# Patient Record
Sex: MALE | Race: WHITE | NOT HISPANIC OR LATINO | Employment: OTHER | URBAN - METROPOLITAN AREA
[De-identification: names, ages, dates, MRNs, and addresses within clinical notes are randomized per-mention and may not be internally consistent; named-entity substitution may affect disease eponyms.]

---

## 2018-07-24 ENCOUNTER — OFFICE VISIT (OUTPATIENT)
Dept: CARDIOLOGY CLINIC | Facility: CLINIC | Age: 53
End: 2018-07-24
Payer: MEDICARE

## 2018-07-24 VITALS
SYSTOLIC BLOOD PRESSURE: 130 MMHG | DIASTOLIC BLOOD PRESSURE: 90 MMHG | BODY MASS INDEX: 29.82 KG/M2 | WEIGHT: 190 LBS | HEIGHT: 67 IN

## 2018-07-24 DIAGNOSIS — I25.10 CORONARY ARTERY DISEASE INVOLVING NATIVE CORONARY ARTERY OF NATIVE HEART WITHOUT ANGINA PECTORIS: ICD-10-CM

## 2018-07-24 DIAGNOSIS — I25.5 ISCHEMIC CARDIOMYOPATHY: Primary | ICD-10-CM

## 2018-07-24 DIAGNOSIS — G35 MULTIPLE SCLEROSIS (HCC): ICD-10-CM

## 2018-07-24 PROBLEM — I10 HTN (HYPERTENSION), BENIGN: Status: ACTIVE | Noted: 2018-07-24

## 2018-07-24 PROBLEM — E78.2 MIXED HYPERLIPIDEMIA: Status: ACTIVE | Noted: 2018-07-24

## 2018-07-24 PROCEDURE — 93000 ELECTROCARDIOGRAM COMPLETE: CPT | Performed by: INTERNAL MEDICINE

## 2018-07-24 PROCEDURE — 99214 OFFICE O/P EST MOD 30 MIN: CPT | Performed by: INTERNAL MEDICINE

## 2018-07-24 RX ORDER — ASPIRIN 81 MG/1
81 TABLET ORAL DAILY
COMMUNITY

## 2018-07-24 RX ORDER — SIMVASTATIN 40 MG
40 TABLET ORAL DAILY
COMMUNITY
Start: 2018-04-30 | End: 2018-07-24 | Stop reason: DRUGHIGH

## 2018-07-24 RX ORDER — LISINOPRIL 2.5 MG/1
2.5 TABLET ORAL DAILY
COMMUNITY
Start: 2018-05-17 | End: 2019-09-19 | Stop reason: SDUPTHER

## 2018-07-24 RX ORDER — OXYCODONE HYDROCHLORIDE 10 MG/1
15 TABLET ORAL 2 TIMES DAILY
COMMUNITY
Start: 2018-07-21

## 2018-07-24 RX ORDER — ATORVASTATIN CALCIUM 40 MG/1
40 TABLET, FILM COATED ORAL DAILY
Qty: 90 TABLET | Refills: 5 | Status: SHIPPED | OUTPATIENT
Start: 2018-07-24 | End: 2019-10-23 | Stop reason: SDUPTHER

## 2018-07-24 RX ORDER — MELATONIN
1000 DAILY
COMMUNITY

## 2018-07-24 RX ORDER — METOPROLOL TARTRATE 50 MG/1
50 TABLET, FILM COATED ORAL 2 TIMES DAILY
COMMUNITY
Start: 2018-07-18

## 2018-07-24 NOTE — PROGRESS NOTES
Patient ID: Sally Fisher is a 45 y o  male  Plan:      Coronary artery disease involving native coronary artery of native heart without angina pectoris  4 years post IWMI with stenting  NO symptoms  Mixed hyperlipidemia  Will switch to higher intensity statin therapy  Ischemic cardiomyopathy  No signs or symptoms of congestive heart failure  Ejection fraction 40% by echo last year with evidence for prior inferior wall MI     HTN (hypertension), benign  Adequately controlled  Salt discretion was discussed  Follow up Plan:  One year EKG and follow-up visit  HPI:   Matt Ordonez comes in for follow-up today regarding prior inferior wall MI with stenting of the right coronary artery in 2014, hypertension, and hyperlipidemia  He has had no recent cardiac symptoms  His main problem in functioning in the world is his multiple sclerosis and difficulty with walking  Results for orders placed or performed in visit on 07/24/18   POCT ECG    Narrative    NSR  Prior IWMI  NSSTs  Other cardiac testing:  Echocardiogram on 06/30/2017 revealed ejection fraction of 40% with evidence for prior inferior wall MI  Mild LVH  Past Surgical History:   Procedure Laterality Date    CARDIAC CATHETERIZATION      ERVIN RCA x2 3/2014     CMP: No results found for: NA, K, CL, CO2, BUN, CREATININE, GLUCOSE, EGFR    Lipid Profile: No results found for: CHOL, TRIG, HDL, LDL      Review of Systems    Complete 12  point ROS reviewed and otherwise non pertinent or negative except as per HPI or as below  Gait:  Difficult  He uses a cane because of knee problems and multiple sclerosis  Objective:     /90   Ht 5' 7" (1 702 m)   Wt 86 2 kg (190 lb)   BMI 29 76 kg/m²     PHYSICAL EXAM:    General:  Normal appearance in no distress  Eyes:  Anicteric  Oral mucosa:  Moist   Neck:  No JVD  Carotid upstrokes are brisk without bruits  No masses  Chest:  Clear to auscultation and percussion    Cardiac: Normal PMI  Normal S1 and S2  No murmur gallop or rub  Abdomen:  Soft and nontender  No palpable organomegaly or aortic enlargement  Extremities:  No peripheral edema  Musculoskeletal:  Gait is poor because of multiple sclerosis  He uses a cane  Vascular:  Femoral pulses are brisk without bruits  Popliteal pulses are intact bilaterally  Pedal pulses are intact  Neuro:  Grossly symmetric  Psych:  Alert and oriented x3  Current Outpatient Prescriptions:     aspirin (ECOTRIN LOW STRENGTH) 81 mg EC tablet, Take 81 mg by mouth daily, Disp: , Rfl:     cholecalciferol (VITAMIN D3) 1,000 units tablet, Take 1,000 Units by mouth daily, Disp: , Rfl:     lisinopril (ZESTRIL) 2 5 mg tablet, Take 2 5 mg by mouth daily, Disp: , Rfl:     metoprolol tartrate (LOPRESSOR) 50 mg tablet, Take 50 mg by mouth 2 (two) times a day, Disp: , Rfl:     oxyCODONE (ROXICODONE) 10 MG TABS, Take 10 mg by mouth 2 (two) times a day, Disp: , Rfl:     sertraline (ZOLOFT) 50 mg tablet, Take 50 mg by mouth daily, Disp: , Rfl:     atorvastatin (LIPITOR) 40 mg tablet, Take 1 tablet (40 mg total) by mouth daily, Disp: 90 tablet, Rfl: 5  Allergies   Allergen Reactions    Sulfa Antibiotics Rash     Past Medical History:   Diagnosis Date    CAD (coronary artery disease)     s/p ERVIN RCA x2 3/2014    History of cardiovascular stress test     3/26/2015 [place] 38% prior inferiorwall MI  No ischemia   History of EKG     6/13/2017 Normal sinus rhythm  Prior inferior wall MI  No change from prior   Hx of echocardiogram     6/30/17 EF 40% Mild LVH   Trace aortic and mitral regurgitation    Hyperlipidemia     Hypertension     Ischemic cardiomyopathy     [facility])     [facility] infarction ([facility] [address] [facility]     PAF (paroxysmal atrial fibrillation) ([facility])     Paroxysmal SVT (supraventricular tachycardia) ([facility])     Pericardial effusion            History   Smoking Status    Never Smoker   Smokeless Tobacco    Never Used

## 2018-07-24 NOTE — ASSESSMENT & PLAN NOTE
No signs or symptoms of congestive heart failure    Ejection fraction 40% by echo last year with evidence for prior inferior wall MI

## 2018-07-24 NOTE — PATIENT INSTRUCTIONS
When run out of simvastatin, change to atorvastatin 40 mg once daily (at night)  If no chest pain or problems, come back in 1 year

## 2019-09-19 ENCOUNTER — OFFICE VISIT (OUTPATIENT)
Dept: CARDIOLOGY CLINIC | Facility: CLINIC | Age: 54
End: 2019-09-19
Payer: MEDICARE

## 2019-09-19 VITALS
HEIGHT: 66 IN | SYSTOLIC BLOOD PRESSURE: 150 MMHG | HEART RATE: 63 BPM | BODY MASS INDEX: 29.89 KG/M2 | WEIGHT: 186 LBS | DIASTOLIC BLOOD PRESSURE: 102 MMHG | OXYGEN SATURATION: 99 %

## 2019-09-19 DIAGNOSIS — E78.2 MIXED HYPERLIPIDEMIA: ICD-10-CM

## 2019-09-19 DIAGNOSIS — I25.10 CORONARY ARTERY DISEASE INVOLVING NATIVE CORONARY ARTERY OF NATIVE HEART WITHOUT ANGINA PECTORIS: ICD-10-CM

## 2019-09-19 DIAGNOSIS — I25.5 ISCHEMIC CARDIOMYOPATHY: ICD-10-CM

## 2019-09-19 DIAGNOSIS — I10 HTN (HYPERTENSION), BENIGN: Primary | ICD-10-CM

## 2019-09-19 PROCEDURE — 99214 OFFICE O/P EST MOD 30 MIN: CPT | Performed by: INTERNAL MEDICINE

## 2019-09-19 PROCEDURE — 93000 ELECTROCARDIOGRAM COMPLETE: CPT | Performed by: INTERNAL MEDICINE

## 2019-09-19 RX ORDER — LISINOPRIL 10 MG/1
10 TABLET ORAL DAILY
Qty: 90 TABLET | Refills: 5 | Status: SHIPPED | OUTPATIENT
Start: 2019-09-19 | End: 2020-12-13

## 2019-09-19 NOTE — PROGRESS NOTES
Patient ID: Shane Son is a 52 y o  male  Plan:      Mixed hyperlipidemia  Tolerating statin tx  Coronary artery disease involving native coronary artery of native heart without angina pectoris  Inferior wall MI 2014 with stenting but residual dysfunction  HTN (hypertension), benign  Inadequately controlled  Will increase lisinopril to 10 mg daily  Follow up Plan:  Apart from the lisinopril dose change, no changes today  Return visit and EKG in 1 year  HPI:  The patient is seen today in follow-up regarding CAD, hyperlipidemia, mild ischemic cardiomyopathy, and hypertension  In 2014 he had an acute inferior wall MI  He received a stent of the right coronary artery but certainly there was residual inferior wall dysfunction  His main functional issue in the world is multiple sclerosis  He sees Neurology at [facility] in this regard  No recent chest pain or chest pressure  No shortness of breath  The biggest issue remains walking  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  Results for orders placed or performed in visit on 09/19/19   POCT ECG    Impression    NSR  Prior IWMI  NSSTs  Most recent or relevant cardiac/vascular testing:    Echocardiogram 6/30/2017:  Ejection fraction 40%  Prior inferior wall MI  Mild LVH  Past Surgical History:   Procedure Laterality Date    CARDIAC CATHETERIZATION      ERVIN RCA x2 3/2014     CMP: No results found for: NA, K, CL, CO2, BUN, CREATININE, GLUCOSE, EGFR    Lipid Profile: No results found for: CHOL, TRIG, HDL, LDL      Review of Systems   10  point ROS  was otherwise non pertinent or negative except as per HPI or as below  Gait:  Uses a cane  Chronic right shoulder issues  Chronic weakness right arm and right leg          Objective:     BP (!) 150/102 (BP Location: Left arm, Patient Position: Sitting, Cuff Size: Standard)   Pulse 63   Ht 5' 6" (1 676 m)   Wt 84 4 kg (186 lb)   SpO2 99% BMI 30 02 kg/m²     PHYSICAL EXAM:    General:  Normal appearance in no distress  Eyes:  Anicteric  Oral mucosa:  Moist   Neck:  No JVD  Carotid upstrokes are brisk without bruits  No masses  Chest:  Clear to auscultation and percussion  Cardiac:  Normal PMI  Normal S1 and S2  No murmur gallop or rub  Abdomen:  Soft and nontender  No palpable organomegaly or aortic enlargement  Extremities:  No peripheral edema  Musculoskeletal:  Symmetric  Vascular:  Femoral pulses are brisk without bruits  Popliteal pulses are intact bilaterally  Pedal pulses are intact  Neuro:  Weakness of the right arm and leg  Psych:  Alert and oriented x3  Current Outpatient Medications:     aspirin (ECOTRIN LOW STRENGTH) 81 mg EC tablet, Take 81 mg by mouth daily, Disp: , Rfl:     atorvastatin (LIPITOR) 40 mg tablet, Take 1 tablet (40 mg total) by mouth daily, Disp: 90 tablet, Rfl: 5    cholecalciferol (VITAMIN D3) 1,000 units tablet, Take 1,000 Units by mouth daily, Disp: , Rfl:     lisinopril (ZESTRIL) 10 mg tablet, Take 1 tablet (10 mg total) by mouth daily, Disp: 90 tablet, Rfl: 5    metoprolol tartrate (LOPRESSOR) 50 mg tablet, Take 50 mg by mouth 2 (two) times a day, Disp: , Rfl:     oxyCODONE (ROXICODONE) 10 MG TABS, Take 15 mg by mouth 2 (two) times a day , Disp: , Rfl:     sertraline (ZOLOFT) 50 mg tablet, Take 50 mg by mouth daily, Disp: , Rfl:   Allergies   Allergen Reactions    Sulfa Antibiotics Rash     Past Medical History:   Diagnosis Date    CAD (coronary artery disease)     s/p ERVIN RCA x2 3/2014    History of cardiovascular stress test     3/26/2015 [place] 38% prior inferiorwall MI  No ischemia   History of EKG     6/13/2017 Normal sinus rhythm  Prior inferior wall MI  No change from prior   Hx of echocardiogram     6/30/17 EF 40% Mild LVH   Trace aortic and mitral regurgitation    Hyperlipidemia     Hypertension     Ischemic cardiomyopathy     [facility] [address] [facility]     [facility] [address] [facility]     PAF (paroxysmal atrial fibrillation) (H[facility])     Paroxysmal SVT (supraventricular tachycardia) (HCC)     Pericardial effusion            Social History     Tobacco Use   Smoking Status Never Smoker   Smokeless Tobacco Never Used

## 2019-09-19 NOTE — PATIENT INSTRUCTIONS
Changing the ultimate dose of lisinopril to 10 mg daily  I sent in a new prescription  In the meantime take 2 of your current lisinopril pills daily until that is finished

## 2019-10-23 DIAGNOSIS — I25.5 ISCHEMIC CARDIOMYOPATHY: ICD-10-CM

## 2019-10-23 DIAGNOSIS — I25.10 CORONARY ARTERY DISEASE INVOLVING NATIVE CORONARY ARTERY OF NATIVE HEART WITHOUT ANGINA PECTORIS: ICD-10-CM

## 2019-10-23 RX ORDER — ATORVASTATIN CALCIUM 40 MG/1
TABLET, FILM COATED ORAL
Qty: 90 TABLET | Refills: 4 | Status: SHIPPED | OUTPATIENT
Start: 2019-10-23 | End: 2020-10-25

## 2020-10-22 ENCOUNTER — OFFICE VISIT (OUTPATIENT)
Dept: CARDIOLOGY CLINIC | Facility: CLINIC | Age: 55
End: 2020-10-22
Payer: MEDICARE

## 2020-10-22 VITALS
WEIGHT: 198 LBS | HEIGHT: 66 IN | BODY MASS INDEX: 31.82 KG/M2 | HEART RATE: 67 BPM | SYSTOLIC BLOOD PRESSURE: 130 MMHG | DIASTOLIC BLOOD PRESSURE: 90 MMHG

## 2020-10-22 DIAGNOSIS — I25.5 ISCHEMIC CARDIOMYOPATHY: ICD-10-CM

## 2020-10-22 DIAGNOSIS — R06.00 DOE (DYSPNEA ON EXERTION): ICD-10-CM

## 2020-10-22 DIAGNOSIS — E78.2 MIXED HYPERLIPIDEMIA: ICD-10-CM

## 2020-10-22 DIAGNOSIS — I25.10 CORONARY ARTERY DISEASE INVOLVING NATIVE CORONARY ARTERY OF NATIVE HEART WITHOUT ANGINA PECTORIS: Primary | ICD-10-CM

## 2020-10-22 PROCEDURE — 93000 ELECTROCARDIOGRAM COMPLETE: CPT | Performed by: INTERNAL MEDICINE

## 2020-10-22 PROCEDURE — 99214 OFFICE O/P EST MOD 30 MIN: CPT | Performed by: INTERNAL MEDICINE

## 2020-10-22 RX ORDER — CLONAZEPAM 0.5 MG/1
TABLET ORAL AS NEEDED
COMMUNITY

## 2020-10-25 DIAGNOSIS — I25.5 ISCHEMIC CARDIOMYOPATHY: ICD-10-CM

## 2020-10-25 DIAGNOSIS — I25.10 CORONARY ARTERY DISEASE INVOLVING NATIVE CORONARY ARTERY OF NATIVE HEART WITHOUT ANGINA PECTORIS: ICD-10-CM

## 2020-10-25 RX ORDER — ATORVASTATIN CALCIUM 40 MG/1
TABLET, FILM COATED ORAL
Qty: 90 TABLET | Refills: 4 | Status: SHIPPED | OUTPATIENT
Start: 2020-10-25 | End: 2022-01-07

## 2020-10-29 ENCOUNTER — HOSPITAL ENCOUNTER (OUTPATIENT)
Dept: NON INVASIVE DIAGNOSTICS | Facility: HOSPITAL | Age: 55
Discharge: HOME | End: 2020-10-29
Attending: INTERNAL MEDICINE
Payer: MEDICARE

## 2020-10-29 ENCOUNTER — HOSPITAL ENCOUNTER (OUTPATIENT)
Dept: NUCLEAR MEDICINE | Facility: HOSPITAL | Age: 55
Discharge: HOME | End: 2020-10-29
Attending: INTERNAL MEDICINE
Payer: MEDICARE

## 2020-10-29 ENCOUNTER — HOSPITAL ENCOUNTER (OUTPATIENT)
Dept: NUCLEAR MEDICINE | Facility: HOSPITAL | Age: 55
Discharge: HOME | End: 2020-10-29
Attending: INTERNAL MEDICINE

## 2020-10-29 DIAGNOSIS — I25.5 ISCHEMIC CARDIOMYOPATHY: ICD-10-CM

## 2020-10-29 DIAGNOSIS — R06.00 DOE (DYSPNEA ON EXERTION): ICD-10-CM

## 2020-10-29 DIAGNOSIS — I25.10 CORONARY ARTERY DISEASE INVOLVING NATIVE CORONARY ARTERY OF NATIVE HEART WITHOUT ANGINA PECTORIS: ICD-10-CM

## 2020-10-29 LAB
ARRHY DURING EX: NORMAL
CHEST PAIN STATEMENT: NORMAL
MAX DIASTOLIC BP: 80 MMHG
MAX HEART RATE: 104 BPM
MAX PREDICTED HEART RATE: 164 BPM
MAX. SYSTOLIC BP: 132 MMHG
PROTOCOL NAME: NORMAL
REASON FOR TERMINATION: NORMAL
TARGET HR FORMULA: NORMAL
TEST INDICATION: NORMAL
TIME IN EXERCISE PHASE: NORMAL

## 2020-10-29 PROCEDURE — 93018 CV STRESS TEST I&R ONLY: CPT | Performed by: INTERNAL MEDICINE

## 2020-10-29 PROCEDURE — 78452 HT MUSCLE IMAGE SPECT MULT: CPT | Performed by: INTERNAL MEDICINE

## 2020-10-29 PROCEDURE — 78452 HT MUSCLE IMAGE SPECT MULT: CPT

## 2020-10-29 PROCEDURE — 93017 CV STRESS TEST TRACING ONLY: CPT

## 2020-10-29 PROCEDURE — G1004 CDSM NDSC: HCPCS

## 2020-10-29 PROCEDURE — 93016 CV STRESS TEST SUPVJ ONLY: CPT | Performed by: INTERNAL MEDICINE

## 2020-10-29 PROCEDURE — A9502 TC99M TETROFOSMIN: HCPCS

## 2020-10-29 RX ADMIN — REGADENOSON 0.4 MG: 0.08 INJECTION, SOLUTION INTRAVENOUS at 10:55

## 2020-12-13 DIAGNOSIS — I10 HTN (HYPERTENSION), BENIGN: ICD-10-CM

## 2020-12-13 DIAGNOSIS — I25.5 ISCHEMIC CARDIOMYOPATHY: ICD-10-CM

## 2020-12-13 RX ORDER — LISINOPRIL 10 MG/1
TABLET ORAL
Qty: 90 TABLET | Refills: 5 | Status: SHIPPED | OUTPATIENT
Start: 2020-12-13 | End: 2022-03-02

## 2021-10-12 ENCOUNTER — OFFICE VISIT (OUTPATIENT)
Dept: CARDIOLOGY CLINIC | Facility: CLINIC | Age: 56
End: 2021-10-12
Payer: MEDICARE

## 2021-10-12 VITALS
WEIGHT: 190 LBS | HEIGHT: 66 IN | HEART RATE: 62 BPM | DIASTOLIC BLOOD PRESSURE: 80 MMHG | SYSTOLIC BLOOD PRESSURE: 120 MMHG | BODY MASS INDEX: 30.53 KG/M2

## 2021-10-12 DIAGNOSIS — E78.2 MIXED HYPERLIPIDEMIA: ICD-10-CM

## 2021-10-12 DIAGNOSIS — I25.5 ISCHEMIC CARDIOMYOPATHY: ICD-10-CM

## 2021-10-12 DIAGNOSIS — I25.10 CORONARY ARTERY DISEASE INVOLVING NATIVE CORONARY ARTERY OF NATIVE HEART WITHOUT ANGINA PECTORIS: Primary | ICD-10-CM

## 2021-10-12 PROCEDURE — 93000 ELECTROCARDIOGRAM COMPLETE: CPT | Performed by: INTERNAL MEDICINE

## 2021-10-12 PROCEDURE — 99214 OFFICE O/P EST MOD 30 MIN: CPT | Performed by: INTERNAL MEDICINE

## 2022-01-07 DIAGNOSIS — I25.5 ISCHEMIC CARDIOMYOPATHY: ICD-10-CM

## 2022-01-07 DIAGNOSIS — I25.10 CORONARY ARTERY DISEASE INVOLVING NATIVE CORONARY ARTERY OF NATIVE HEART WITHOUT ANGINA PECTORIS: ICD-10-CM

## 2022-01-07 RX ORDER — ATORVASTATIN CALCIUM 40 MG/1
TABLET, FILM COATED ORAL
Qty: 90 TABLET | Refills: 4 | Status: SHIPPED | OUTPATIENT
Start: 2022-01-07

## 2022-03-01 DIAGNOSIS — I10 HTN (HYPERTENSION), BENIGN: ICD-10-CM

## 2022-03-01 DIAGNOSIS — I25.5 ISCHEMIC CARDIOMYOPATHY: ICD-10-CM

## 2022-03-02 RX ORDER — LISINOPRIL 10 MG/1
TABLET ORAL
Qty: 90 TABLET | Refills: 5 | Status: SHIPPED | OUTPATIENT
Start: 2022-03-02

## 2023-05-15 ENCOUNTER — OFFICE VISIT (OUTPATIENT)
Dept: URGENT CARE | Facility: CLINIC | Age: 58
End: 2023-05-15

## 2023-05-15 ENCOUNTER — APPOINTMENT (OUTPATIENT)
Dept: RADIOLOGY | Facility: CLINIC | Age: 58
End: 2023-05-15

## 2023-05-15 VITALS
WEIGHT: 200 LBS | OXYGEN SATURATION: 98 % | DIASTOLIC BLOOD PRESSURE: 86 MMHG | SYSTOLIC BLOOD PRESSURE: 140 MMHG | HEART RATE: 69 BPM | BODY MASS INDEX: 32.14 KG/M2 | HEIGHT: 66 IN | RESPIRATION RATE: 18 BRPM | TEMPERATURE: 98.6 F

## 2023-05-15 DIAGNOSIS — S82.832A OTHER CLOSED FRACTURE OF DISTAL END OF LEFT FIBULA, INITIAL ENCOUNTER: Primary | ICD-10-CM

## 2023-05-15 DIAGNOSIS — S99.922A INJURY OF LEFT FOOT, INITIAL ENCOUNTER: ICD-10-CM

## 2023-05-15 RX ORDER — OXYCODONE HYDROCHLORIDE 20 MG/1
TABLET ORAL
COMMUNITY
Start: 2023-04-28

## 2023-05-15 NOTE — PROGRESS NOTES
[facility]        NAME: Esequiel Enriquez is a 59 y o  male  : 1965    MRN: [id number]  DATE: May 15, 2023  TIME: 1:51 PM    Assessment and Plan   Other closed fracture of distal end of left fibula, initial encounter [X17 [id number]]  1  Other closed fracture of distal end of left fibula, initial encounter  XR foot 3+ vw left    XR ankle 3+ vw left            Patient Instructions       Follow up with PCP in 3-5 days  Proceed to  ER if symptoms worsen  Chief Complaint     Chief Complaint   Patient presents with   • Foot Pain     On Saturday twisted left foot and ankle  He thinks he heard the bone cracking in foot and ankle  Has a HX of MS  History of Present Illness       Patient is a 58 y/o/m presenting [facility] with left foot and ankle pain  Patient twisted ankle 2 days ago and fell  He reports hearing bone crack during that time  Patient denies       Review of Systems   Review of Systems   Constitutional: Negative for chills and fever  HENT: Negative for ear pain and sore throat  Eyes: Negative for pain and visual disturbance  Respiratory: Negative for cough and shortness of breath  Cardiovascular: Negative for chest pain and palpitations  Gastrointestinal: Negative for abdominal pain and vomiting  Genitourinary: Negative for dysuria and hematuria  Musculoskeletal: Positive for arthralgias (Left foot ankle and ankle pain)  Negative for back pain  Skin: Negative for color change and rash  Neurological: Negative for seizures and syncope  All other systems reviewed and are negative          Current Medications       Current Outpatient Medications:   •  aspirin (ECOTRIN LOW STRENGTH) 81 mg EC tablet, Take 81 mg by mouth daily, Disp: , Rfl:   •  atorvastatin (LIPITOR) 40 mg tablet, take 1 tablet by mouth once daily, Disp: 90 tablet, Rfl: 3  •  cholecalciferol (VITAMIN D3) 1,000 units tablet, Take 1,000 Units by mouth daily, Disp: , Rfl:   •  lisinopril (ZESTRIL) 10 mg "tablet, TAKE ONE TABLET BY MOUTH ONCE DAILY, Disp: 90 tablet, Rfl: 5  •  metoprolol tartrate (LOPRESSOR) 50 mg tablet, Take 50 mg by mouth 2 (two) times a day, Disp: , Rfl:   •  clonazePAM (KlonoPIN) 0 5 mg tablet, as needed (Patient not taking: Reported on 5/15/2023), Disp: , Rfl:   •  oxyCODONE (ROXICODONE) 20 MG TABS, take 1 tablet by mouth every 8 hours if needed for MODERATE PAIN ( PAIN SCALE 4-6 ), Disp: , Rfl:   •  sertraline (ZOLOFT) 50 mg tablet, Take 50 mg by mouth as needed  (Patient not taking: Reported on 5/15/2023), Disp: , Rfl:     Current Allergies     Allergies as of 05/15/2023 - Reviewed 05/15/2023   Allergen Reaction Noted   • Sulfa antibiotics Rash 07/20/2018            The following portions of the patient's history were reviewed and updated as appropriate: allergies, current medications, past family history, past medical history, past social history, past surgical history and problem list      Past Medical History:   Diagnosis Date   • CAD (coronary artery disease)     s/p ERVIN RCA x2 3/2014   • History of cardiovascular stress test     3/26/2015 [place] 38% prior inferiorwall MI  No ischemia  • History of EKG     6/13/2017 Normal sinus rhythm  Prior inferior wall MI  No change from prior  • Hx of echocardiogram     6/30/17 EF 40% Mild LVH  Trace aortic and mitral regurgitation   • Hyperlipidemia    • Hypertension    • Ischemic cardiomyopathy    • MS (multiple sclerosis) ([facility])    • Myocardial infarction ([facility])    • PAF (paroxysmal atrial fibrillation) ([facility])    • Paroxysmal SVT (supraventricular tachycardia) ([facility])    • Pericardial effusion        Past Surgical History:   Procedure Laterality Date   • CARDIAC CATHETERIZATION      ERVIN RCA x2 3/2014       Family History   Problem Relation Age of Onset   • Heart disease Father    • Heart attack Father          Medications have been verified          Objective   /86   Pulse 69   Temp 98 6 °F (37 °C)   Resp 18   Ht 5' 6\" (1 676 m)   Wt 90 7 " kg (200 lb)   SpO2 98%   BMI 32 28 kg/m²   No LMP for male patient  Physical Exam     Physical Exam  Constitutional:       Appearance: Normal appearance  He is normal weight  HENT:      Head: Normocephalic and atraumatic  Nose: Nose normal       Mouth/Throat:      Mouth: Mucous membranes are moist    Eyes:      Extraocular Movements: Extraocular movements intact  Conjunctiva/sclera: Conjunctivae normal       Pupils: Pupils are equal, round, and reactive to light  Cardiovascular:      Rate and Rhythm: Normal rate  Pulmonary:      Effort: Pulmonary effort is normal    Musculoskeletal:         General: Normal range of motion  Cervical back: Normal range of motion and neck supple  Legs:    Skin:     General: Skin is warm and dry  Neurological:      General: No focal deficit present  Mental Status: He is alert and oriented to person, place, and time     Psychiatric:         Mood and Affect: Mood normal          Behavior: Behavior normal

## 2023-05-15 NOTE — PATIENT INSTRUCTIONS
Orthopedic appointment scheduled for Tuesday 05/16/2023 at [address] [facility] w/ Dr Thakur  Non weight bearing  If symptoms worsen report to the ED  Ankle Fracture   WHAT YOU NEED TO KNOW:   An ankle fracture is a break in 1 or more of the bones in your ankle  DISCHARGE INSTRUCTIONS:   Call your local emergency number (911 in the [address] [facility] [facility]) for any of the following: You feel lightheaded, short of breath, and have chest pain  You cough up blood  Return to the emergency department if:   Your leg feels warm, tender, and painful  It may look swollen and red  Blood soaks through your bandage  You have severe pain in your ankle  Your cast feels too tight  Your foot or toes are cold or numb  Your foot or toenails turn blue or gray  Call your doctor if:   Your splint feels too tight  Your swelling has increased or returned  You have a fever  Your pain does not go away, even after treatment  You have questions or concerns about your condition or care  Medicines: You may need any of the following:  Acetaminophen  decreases pain and fever  It is available without a doctor's order  Ask how much to take and how often to take it  Follow directions  Read the labels of all other medicines you are using to see if they also contain acetaminophen, or ask your doctor or pharmacist  Acetaminophen can cause liver damage if not taken correctly  NSAIDs , such as ibuprofen, help decrease swelling, pain, and fever  This medicine is available with or without a doctor's order  NSAIDs can cause stomach bleeding or kidney problems in certain people  If you take blood thinner medicine, always ask your healthcare provider if NSAIDs are safe for you  Always read the medicine label and follow directions  Prescription pain medicine  may be given  Ask your healthcare provider how to take this medicine safely  Some prescription pain medicines contain acetaminophen   Do not take other medicines that contain acetaminophen without talking to your healthcare provider  Too much acetaminophen may cause liver damage  Prescription pain medicine may cause constipation  Ask your healthcare provider how to prevent or treat constipation  Take your medicine as directed  Contact your healthcare provider if you think your medicine is not helping or if you have side effects  Tell your provider if you are allergic to any medicine  Keep a list of the medicines, vitamins, and herbs you take  Include the amounts, and when and why you take them  Bring the list or the pill bottles to follow-up visits  Carry your medicine list with you in case of an emergency  Self-care:       Rest  your ankle so that it can heal  Return to normal activities as directed  Apply ice  on your ankle for 15 to 20 minutes every hour or as directed  Use an ice pack, or put crushed ice in a plastic bag  Cover it with a towel  Ice helps prevent tissue damage and decreases swelling and pain  Use a support device,  such as a brace, cast, or splint to limit your movement and protect your ankle  You may need to use crutches to protect your ankle and decrease your pain as you move around  Do not remove your device and do not put weight on your injured ankle  Elevate  your ankle above the level of your heart as often as you can  This will help decrease swelling and pain  Prop your ankle on pillows or blankets to keep it elevated comfortably  Splint and cast care:  Cover the splint or cast before you bathe so it does not get wet  Tape 2 plastic trash bags to your skin above the cast  Try to keep your ankle out of the water as much as possible  Follow up with your doctor in 1 to 2 days, or as directed: Your fracture may need to be reduced (bones pushed back into place) or you may need surgery  Write down your questions so you remember to ask them during your visits    © Copyright [employer] 2022 Information is for End User's use only and may not be sold, redistributed or otherwise used for commercial purposes  The above information is an  only  It is not intended as medical advice for individual conditions or treatments  Talk to your doctor, nurse or pharmacist before following any medical regimen to see if it is safe and effective for you

## 2023-05-16 VITALS
HEIGHT: 66 IN | SYSTOLIC BLOOD PRESSURE: 162 MMHG | DIASTOLIC BLOOD PRESSURE: 91 MMHG | HEART RATE: 66 BPM | BODY MASS INDEX: 32.28 KG/M2

## 2023-05-16 DIAGNOSIS — S82.832A CLOSED FRACTURE OF DISTAL END OF LEFT FIBULA, UNSPECIFIED FRACTURE MORPHOLOGY, INITIAL ENCOUNTER: Primary | ICD-10-CM

## 2023-05-16 NOTE — PROGRESS NOTES
ASSESSMENT/PLAN:    Diagnoses and all orders for this visit:    Closed fracture of distal end of left fibula, unspecified fracture morphology, initial encounter    Other orders  -     Fracture / Dislocation Treatment        X-rays of the patient's left ankle are consistent with a nondisplaced distal fibula fracture  The mortise is intact  There is no tenderness to palpation along the medial aspect of the ankle  The patient was given a high tide Cam boot to wear  He should be nonweightbearing of his left lower extremity  He will follow-up with our office in 1 month with new x-rays of his left ankle 3 views  He is acceptable to this plan  Return in about 1 month (around 6/16/2023)  The patient has a nondisplaced fracture of his left lateral malleoli  There is no medial pain  There is no syndesmotic widening  This can be treated conservatively  He was placed in a cam boot  He was instructed to nonweightbear  Follow-up 3 weeks evaluation with new x-rays of left ankle-3 views  If his condition changes, he would not hesitate to let us know    _____________________________________________________  CHIEF COMPLAINT:  Chief Complaint   Patient presents with   • Left Ankle - Pain         SUBJECTIVE:  Rosita Robertson is a 59 y o  male who presents to our office complaining of left ankle pain  The patient states that on 5/13/2023 he injured his left foot/ankle  He went to urgent care yesterday where x-rays were performed which were consistent with a fracture  The patient does have a history of MS and states that his right side is weaker than his left  He denies any fever or chills      The following portions of the patient's history were reviewed and updated as appropriate: allergies, current medications, past family history, past medical history, past social history, past surgical history and problem list     PAST MEDICAL HISTORY:  Past Medical History:   Diagnosis Date   • CAD (coronary artery disease) s/p ERVIN RCA x2 3/2014   • History of cardiovascular stress test     3/26/2015 [place] 38% prior inferiorwall MI  No ischemia  • History of EKG     6/13/2017 Normal sinus rhythm  Prior inferior wall MI  No change from prior  • Hx of echocardiogram     6/30/17 EF 40% Mild LVH   Trace aortic and mitral regurgitation   • Hyperlipidemia    • Hypertension    • Ischemic cardiomyopathy    • MS (multiple sclerosis) ([facility])    • Myocardial infarction ([facility])    • PAF (paroxysmal atrial fibrillation) ([facility])    • Paroxysmal SVT (supraventricular tachycardia) ([facility])    • Pericardial effusion        PAST SURGICAL HISTORY:  Past Surgical History:   Procedure Laterality Date   • CARDIAC CATHETERIZATION      ERVIN RCA x2 3/2014       FAMILY HISTORY:  Family History   Problem Relation Age of Onset   • Heart disease Father    • Heart attack Father        SOCIAL HISTORY:  Social History     Tobacco Use   • Smoking status: Never   • Smokeless tobacco: Never       MEDICATIONS:    Current Outpatient Medications:   •  aspirin (ECOTRIN LOW STRENGTH) 81 mg EC tablet, Take 81 mg by mouth daily, Disp: , Rfl:   •  atorvastatin (LIPITOR) 40 mg tablet, take 1 tablet by mouth once daily, Disp: 90 tablet, Rfl: 3  •  cholecalciferol (VITAMIN D3) 1,000 units tablet, Take 1,000 Units by mouth daily, Disp: , Rfl:   •  lisinopril (ZESTRIL) 10 mg tablet, TAKE ONE TABLET BY MOUTH ONCE DAILY, Disp: 90 tablet, Rfl: 5  •  metoprolol tartrate (LOPRESSOR) 50 mg tablet, Take 50 mg by mouth 2 (two) times a day, Disp: , Rfl:   •  oxyCODONE (ROXICODONE) 20 MG TABS, take 1 tablet by mouth every 8 hours if needed for MODERATE PAIN ( PAIN SCALE 4-6 ), Disp: , Rfl:   •  clonazePAM (KlonoPIN) 0 5 mg tablet, as needed (Patient not taking: Reported on 5/15/2023), Disp: , Rfl:   •  sertraline (ZOLOFT) 50 mg tablet, Take 50 mg by mouth as needed  (Patient not taking: Reported on 5/15/2023), Disp: , Rfl:     ALLERGIES:  Allergies   Allergen Reactions   • Sulfa Antibiotics Rash "      ROS:  Review of Systems     Constitutional: Negative for fatigue, fever or loss of appetite  HENT: Negative  Respiratory: Negative for shortness of breath, dyspnea  Cardiovascular: Negative for chest pain/tightness  Gastrointestinal: Negative for abdominal pain, N/V  Endocrine: Negative for cold/heat intolerance, unexplained weight loss/gain  Genitourinary: Negative for flank pain, dysuria, hematuria  Musculoskeletal: Positive for arthralgia   Skin: Negative for rash  Neurological: Negative for numbness or tingling  Psychiatric/Behavioral: Negative for agitation  _____________________________________________________  PHYSICAL EXAMINATION:    Blood pressure 162/91, pulse 66, height 5' 6\" (1 676 m)  Constitutional: Oriented to person, place, and time  Appears well-developed and well-nourished  No distress  HENT:   Head: Normocephalic  Eyes: Conjunctivae are normal  Right eye exhibits no discharge  Left eye exhibits no discharge  No scleral icterus  Cardiovascular: Normal rate  Pulmonary/Chest: Effort normal    Neurological: Alert and oriented to person, place, and time  Skin: Skin is warm and dry  No rash noted  Not diaphoretic  No erythema  No pallor  Psychiatric: Normal mood and affect  Behavior is normal  Judgment and thought content normal       MUSCULOSKELETAL EXAMINATION:   Physical Exam  Ortho Exam    Left lower extremity is neurovascularly intact  Toes are pink and mobile  Compartments are soft  Range of motion of the ankle is limited to pain  There is significant swelling along the foot and ankle  Tenderness to palpation along fracture site  Brisk cap refill    Objective:  BP Readings from Last 1 Encounters:   05/16/23 162/91      Wt Readings from Last 1 Encounters:   05/15/23 90 7 kg (200 lb)        BMI:   Estimated body mass index is 32 28 kg/m² as calculated from the following:    Height as of this encounter: 5' 6\" (1 676 m)      Weight as of 5/15/23: 90 7 kg (200 " lb)       PROCEDURES PERFORMED:  Fracture / Dislocation Treatment    Date/Time: 5/16/2023 3:30 PM  Performed by: Omar Moore DO  Authorized by: Omar Moore DO     Patient Location:  [facility] Protocol:  Risks and benefits: risks, benefits and alternatives were discussed  Consent given by: patient  Site marked: the operative site was marked  Radiology Images displayed and confirmed  If images not available, report reviewed: imaging studies available      Injury location:  Ankle  Location details:  Left ankle  Injury type:  Fracture  Fracture type: lateral malleolus    Fracture type: lateral malleolus    Neurovascular status: Neurovascularly intact    Distal perfusion: normal    Neurological function: normal    Range of motion: reduced    Local anesthesia used?: No    Manipulation performed?: No    Immobilization: Cam boot    Neurovascular status: Neurovascularly intact    Distal perfusion: normal    Neurological function: normal    Range of motion: unchanged            Scribe Attestation    I,:  Mark Porter PA-C am acting as a scribe while in the presence of the attending physician :       I,:  Omar Moore DO personally performed the services described in this documentation    as scribed in my presence :

## 2023-05-17 DIAGNOSIS — S82.832A CLOSED FRACTURE OF DISTAL END OF LEFT FIBULA, UNSPECIFIED FRACTURE MORPHOLOGY, INITIAL ENCOUNTER: Primary | ICD-10-CM

## 2023-06-16 ENCOUNTER — OFFICE VISIT (OUTPATIENT)
Dept: OBGYN CLINIC | Facility: CLINIC | Age: 58
End: 2023-06-16

## 2023-06-16 VITALS
SYSTOLIC BLOOD PRESSURE: 141 MMHG | HEART RATE: 64 BPM | HEIGHT: 66 IN | BODY MASS INDEX: 32.28 KG/M2 | DIASTOLIC BLOOD PRESSURE: 87 MMHG

## 2023-06-16 DIAGNOSIS — S82.832D CLOSED FRACTURE OF DISTAL END OF LEFT FIBULA WITH ROUTINE HEALING, UNSPECIFIED FRACTURE MORPHOLOGY, SUBSEQUENT ENCOUNTER: Primary | ICD-10-CM

## 2023-06-16 PROCEDURE — 99024 POSTOP FOLLOW-UP VISIT: CPT | Performed by: ORTHOPAEDIC SURGERY

## 2023-06-16 NOTE — PROGRESS NOTES
Assessment/Plan:   Diagnoses and all orders for this visit:    Closed fracture of distal end of left fibula with routine healing, unspecified fracture morphology, subsequent encounter  -     XR ankle 3+ vw left; Future    Reviewed today's physical exam findings and x-ray findings with patient at time of visit  His left distal fibula fracture demonstrates maintained alignment as compared to previous imaging on x-ray today  Has minimal pain over the fracture site, which is consistent with routine healing  He will continue nonweightbearing restriction and immobilization in the boot for an additional 4 weeks  Continue OTC NSAIDs/Tylenol as needed for pain and soreness  He will be seen for follow-up in 4 weeks at which time we will repeat x-rays, 3 views left ankle, to check fracture healing  Should any questions or concerns arise prior to that time, he can contact the office  Patient expresses understanding and is in agreement with this treatment plan  Fortunately, there is early fracture healing without displacement  Would recommend continuation of cam boot  Strictly nonweightbearing  Follow-up 1 month reevaluation with new x-rays of left ankle-3 views  If his condition changes, he would not hesitate to let us know    Subjective:   Patient ID: Teagan Serum  1965     HPI  Patient is a 59 y o  male who presents for follow-up evaluation left distal fibula fracture sustained 5/13/2023  He is now just shy of 5 weeks post injury  He reports that he has been consistent with nonweightbearing on the left lower extremity, and with immobilization in the boot  He reports improvement in his overall pain, states that he has only 2-3/10 pain at rest  He denies any new onset bruising, swelling, numbness, or tingling      The following portions of the patient's history were reviewed and updated as appropriate:  Past medical history, past surgical history, Family history, social history, current medications and allergies    Past Medical History:   Diagnosis Date   • CAD (coronary artery disease)     s/p ERVIN RCA x2 3/2014   • History of cardiovascular stress test     3/26/2015 [place] 38% prior inferiorwall MI  No ischemia  • History of EKG     6/13/2017 Normal sinus rhythm  Prior inferior wall MI  No change from prior  • Hx of echocardiogram     6/30/17 EF 40% Mild LVH   Trace aortic and mitral regurgitation   • Hyperlipidemia    • Hypertension    • Ischemic cardiomyopathy    • MS (multiple sclerosis) ([facility]    • Myocardial infarction ([facility])    • PAF (paroxysmal atrial fibrillation) ([facility])    • Paroxysmal SVT (supraventricular tachycardia) ([facility])    • Pericardial effusion        Past Surgical History:   Procedure Laterality Date   • CARDIAC CATHETERIZATION      ERVIN RCA x2 3/2014       Family History   Problem Relation Age of Onset   • Heart disease Father    • Heart attack Father        Social History     Socioeconomic History   • Marital status: Unknown     Spouse name: Not on file   • Number of children: Not on file   • Years of education: Not on file   • Highest education level: Not on file   Occupational History   • Not on file   Tobacco Use   • Smoking status: Never   • Smokeless tobacco: Never   Substance and Sexual Activity   • Alcohol use: Not on file   • Drug use: Not on file   • Sexual activity: Not on file   Other Topics Concern   • Not on file   Social History Narrative   • Not on file     Social Determinants of Health     Financial Resource Strain: Not on file   Food Insecurity: Not on file   Transportation Needs: Not on file   Physical Activity: Not on file   Stress: Not on file   Social Connections: Not on file   Intimate Partner Violence: Not on file   Housing Stability: Not on file         Current Outpatient Medications:   •  aspirin (ECOTRIN LOW STRENGTH) 81 mg EC tablet, Take 81 mg by mouth daily, Disp: , Rfl:   •  atorvastatin (LIPITOR) 40 mg tablet, take 1 tablet by mouth once daily, Disp: 90 tablet, Rfl: "3  •  cholecalciferol (VITAMIN D3) 1,000 units tablet, Take 1,000 Units by mouth daily, Disp: , Rfl:   •  lisinopril (ZESTRIL) 10 mg tablet, TAKE ONE TABLET BY MOUTH ONCE DAILY, Disp: 90 tablet, Rfl: 0  •  metoprolol tartrate (LOPRESSOR) 50 mg tablet, Take 50 mg by mouth 2 (two) times a day, Disp: , Rfl:   •  oxyCODONE (ROXICODONE) 20 MG TABS, take 1 tablet by mouth every 8 hours if needed for MODERATE PAIN ( PAIN SCALE 4-6 ), Disp: , Rfl:   •  clonazePAM (KlonoPIN) 0 5 mg tablet, as needed (Patient not taking: Reported on 5/15/2023), Disp: , Rfl:   •  sertraline (ZOLOFT) 50 mg tablet, Take 50 mg by mouth as needed  (Patient not taking: Reported on 5/15/2023), Disp: , Rfl:     Allergies   Allergen Reactions   • Sulfa Antibiotics Rash       Review of Systems   Constitutional: Negative for chills, fatigue and fever  Gastrointestinal: Negative for nausea  Musculoskeletal:        As noted in HPI   Neurological: Negative for dizziness, numbness and headaches  All other systems reviewed and are negative  Objective:  /87 (BP Location: Left arm, Patient Position: Sitting, Cuff Size: Large)   Pulse 64   Ht 5' 6\" (1 676 m)   BMI 32 28 kg/m²     Ortho Exam  Left ankle -   Patient presents with Cam walker boot appropriately in place and intact at time of visit -removed for examination without difficulty  Patient is nonweightbearing in a wheelchair  No anatomical deformity  Skin is warm and dry to touch with no signs of erythema, ecchymosis, infection  Mild generalized soft tissue swelling, no effusion noted  ROM limited secondary to stiffness following immobilization  Minimally TTP over left distal fibula/lateral malleolus  MMT deferred  Calf compartments are soft and supple  2+ TP and DP pulses with brisk capillary refill to the toes  Sural, saphenous, tibial, superficial and deep peroneal motor and sensory distributions intact  Sensation to light touch intact distally    Physical Exam  Vitals reviewed   " Constitutional:       Appearance: He is well-developed  HENT:      Head: Normocephalic and atraumatic  Nose: Nose normal    Eyes:      Conjunctiva/sclera: Conjunctivae normal    Cardiovascular:      Rate and Rhythm: Normal rate  Pulmonary:      Effort: Pulmonary effort is normal    Musculoskeletal:      Cervical back: Neck supple  Skin:     General: Skin is warm and dry  Capillary Refill: Capillary refill takes less than 2 seconds  Neurological:      Mental Status: He is alert and oriented to person, place, and time  Psychiatric:         Mood and Affect: Mood normal          Behavior: Behavior normal         Diagnostic Test Review:  Attending Physician has personally reviewed pertinent imaging in PACS, impression is as follows:    Review of radiographic series taken 6/16/2023 of the left ankle shows still visible distal fibular fracture with maintained alignment as compared to previous imaging, as well as early callus formation and trabecular bridging consistent with routine healing      Procedures   No procedures performed this visit    Scribe Attestation    I,:  Rene Oneal am acting as a scribe while in the presence of the attending physician :       I,:  Jennifer Lackey DO personally performed the services described in this documentation    as scribed in my presence :

## 2023-07-14 ENCOUNTER — OFFICE VISIT (OUTPATIENT)
Dept: OBGYN CLINIC | Facility: CLINIC | Age: 58
End: 2023-07-14

## 2023-07-14 VITALS
HEART RATE: 62 BPM | BODY MASS INDEX: 32.28 KG/M2 | HEIGHT: 66 IN | DIASTOLIC BLOOD PRESSURE: 90 MMHG | SYSTOLIC BLOOD PRESSURE: 136 MMHG

## 2023-07-14 DIAGNOSIS — S82.832D CLOSED FRACTURE OF DISTAL END OF LEFT FIBULA WITH ROUTINE HEALING, UNSPECIFIED FRACTURE MORPHOLOGY, SUBSEQUENT ENCOUNTER: Primary | ICD-10-CM

## 2023-07-14 PROCEDURE — 99024 POSTOP FOLLOW-UP VISIT: CPT | Performed by: ORTHOPAEDIC SURGERY

## 2023-07-14 NOTE — PROGRESS NOTES
ASSESSMENT/PLAN:    Diagnoses and all orders for this visit:    Closed fracture of distal end of left fibula with routine healing, unspecified fracture morphology, subsequent encounter  -     XR ankle 3+ vw left; Future        The patient's left distal fibula fracture is clinically healed. X-rays of the patient's left ankle are consistent with good callus formation. The fracture is healed in acceptable alignment. The patient was given a lace up ankle brace. He will follow-up with our office in 2 months with new x-rays of his left ankle 3 views. He is acceptable to this plan. Return in about 2 months (around 9/14/2023). Clinically and radiographically, the fracture is healed. He was converted to a rocket sock. Follow-up 2 months evaluation with final x-rays of left ankle-3 views      _____________________________________________________  CHIEF COMPLAINT:  Chief Complaint   Patient presents with   • Left Ankle - Fracture, Follow-up         SUBJECTIVE:  Bam Leahy is a 59 y.o. male who presents to our office for a follow-up visit. The patient is status post left distal fibula fracture from 5/13/2023. He has been nonweightbearing while wearing a high tide Cam boot. His pain is greatly improved. He denies any numbness or tingling. He denies any fever or chills. The following portions of the patient's history were reviewed and updated as appropriate: allergies, current medications, past family history, past medical history, past social history, past surgical history and problem list.    PAST MEDICAL HISTORY:  Past Medical History:   Diagnosis Date   • CAD (coronary artery disease)     s/p ERVIN RCA x2 3/2014   • History of cardiovascular stress test     3/26/2015 [place] 38% prior inferiorwall MI. No ischemia. • History of EKG     6/13/2017 Normal sinus rhythm. Prior inferior wall MI. No change from prior. • Hx of echocardiogram     6/30/17 EF 40% Mild LVH.  Trace aortic and mitral regurgitation   • Hyperlipidemia    • Hypertension    • Ischemic cardiomyopathy    • MS (multiple sclerosis) ([facility])    • Myocardial infarction ([address])    • PAF (paroxysmal atrial fibrillation) ([facility])    • Paroxysmal SVT (supraventricular tachycardia) ([facility])    • Pericardial effusion        PAST SURGICAL HISTORY:  Past Surgical History:   Procedure Laterality Date   • CARDIAC CATHETERIZATION      ERVIN RCA x2 3/2014       FAMILY HISTORY:  Family History   Problem Relation Age of Onset   • Heart disease Father    • Heart attack Father        SOCIAL HISTORY:  Social History     Tobacco Use   • Smoking status: Never   • Smokeless tobacco: Never   Vaping Use   • Vaping Use: Never used   Substance Use Topics   • Alcohol use: Not Currently   • Drug use: Never       MEDICATIONS:    Current Outpatient Medications:   •  aspirin (ECOTRIN LOW STRENGTH) 81 mg EC tablet, Take 81 mg by mouth daily, Disp: , Rfl:   •  atorvastatin (LIPITOR) 40 mg tablet, take 1 tablet by mouth once daily, Disp: 90 tablet, Rfl: 3  •  cholecalciferol (VITAMIN D3) 1,000 units tablet, Take 1,000 Units by mouth daily, Disp: , Rfl:   •  lisinopril (ZESTRIL) 10 mg tablet, TAKE ONE TABLET BY MOUTH ONCE DAILY, Disp: 90 tablet, Rfl: 0  •  metoprolol tartrate (LOPRESSOR) 50 mg tablet, Take 50 mg by mouth 2 (two) times a day, Disp: , Rfl:   •  oxyCODONE (ROXICODONE) 20 MG TABS, take 1 tablet by mouth every 8 hours if needed for MODERATE PAIN ( PAIN SCALE 4-6 ), Disp: , Rfl:   •  clonazePAM (KlonoPIN) 0.5 mg tablet, as needed (Patient not taking: Reported on 5/15/2023), Disp: , Rfl:   •  sertraline (ZOLOFT) 50 mg tablet, Take 50 mg by mouth as needed  (Patient not taking: Reported on 5/15/2023), Disp: , Rfl:     ALLERGIES:  Allergies   Allergen Reactions   • Sulfa Antibiotics Rash       ROS:  Review of Systems     Constitutional: Negative for fatigue, fever or loss of appetite. HENT: Negative. Respiratory: Negative for shortness of breath, dyspnea.     Cardiovascular: Negative for chest pain/tightness. Gastrointestinal: Negative for abdominal pain, N/V. Endocrine: Negative for cold/heat intolerance, unexplained weight loss/gain. Genitourinary: Negative for flank pain, dysuria, hematuria. Musculoskeletal: Negative for arthralgia   Skin: Negative for rash. Neurological: Negative for numbness or tingling  Psychiatric/Behavioral: Negative for agitation. _____________________________________________________  PHYSICAL EXAMINATION:    Blood pressure 136/90, pulse 62, height 5' 6" (1.676 m). Constitutional: Oriented to person, place, and time. Appears well-developed and well-nourished. No distress. HENT:   Head: Normocephalic. Eyes: Conjunctivae are normal. Right eye exhibits no discharge. Left eye exhibits no discharge. No scleral icterus. Cardiovascular: Normal rate. Pulmonary/Chest: Effort normal.   Neurological: Alert and oriented to person, place, and time. Skin: Skin is warm and dry. No rash noted. Not diaphoretic. No erythema. No pallor. Psychiatric: Normal mood and affect. Behavior is normal. Judgment and thought content normal.      MUSCULOSKELETAL EXAMINATION:   Physical Exam  Ortho Exam    Left lower extremity is neurovascularly intact  Toes are pink and mobile  Compartments are soft  No tenderness to palpation along previous fracture site  Brisk cap refill  Sensation intact  Negative Homans  Objective:  BP Readings from Last 1 Encounters:   07/14/23 136/90      Wt Readings from Last 1 Encounters:   05/15/23 90.7 kg (200 lb)        BMI:   Estimated body mass index is 32.28 kg/m² as calculated from the following:    Height as of this encounter: 5' 6" (1.676 m). Weight as of 5/15/23: 90.7 kg (200 lb).       Scribe Attestation    I,:  Floyd Vuong PA-C am acting as a scribe while in the presence of the attending physician.:       I,:  Bonny Wild, DO personally performed the services described in this documentation    as scribed in my presence.:

## 2023-08-30 DIAGNOSIS — I25.5 ISCHEMIC CARDIOMYOPATHY: ICD-10-CM

## 2023-08-30 DIAGNOSIS — I10 HTN (HYPERTENSION), BENIGN: ICD-10-CM

## 2023-08-30 RX ORDER — LISINOPRIL 10 MG/1
TABLET ORAL
Qty: 90 TABLET | Refills: 0 | OUTPATIENT
Start: 2023-08-30

## 2023-09-25 ENCOUNTER — OFFICE VISIT (OUTPATIENT)
Dept: OBGYN CLINIC | Facility: CLINIC | Age: 58
End: 2023-09-25
Payer: MEDICARE

## 2023-09-25 VITALS
HEIGHT: 66 IN | SYSTOLIC BLOOD PRESSURE: 133 MMHG | BODY MASS INDEX: 32.28 KG/M2 | HEART RATE: 64 BPM | DIASTOLIC BLOOD PRESSURE: 90 MMHG

## 2023-09-25 DIAGNOSIS — S82.832D CLOSED FRACTURE OF DISTAL END OF LEFT FIBULA WITH ROUTINE HEALING, UNSPECIFIED FRACTURE MORPHOLOGY, SUBSEQUENT ENCOUNTER: Primary | ICD-10-CM

## 2023-09-25 PROCEDURE — 99213 OFFICE O/P EST LOW 20 MIN: CPT | Performed by: ORTHOPAEDIC SURGERY

## 2023-09-25 RX ORDER — LISINOPRIL 20 MG/1
20 TABLET ORAL DAILY
COMMUNITY
Start: 2023-07-23

## 2023-09-25 NOTE — PROGRESS NOTES
Assessment/Plan:   Diagnoses and all orders for this visit:    Closed fracture of distal end of left fibula with routine healing, unspecified fracture morphology, subsequent encounter  -     XR ankle 3+ vw left; Future    Other orders  -     lisinopril (ZESTRIL) 20 mg tablet; Take 20 mg by mouth daily    Discussed with patient that today's physical exam is essentially benign. Today's x-rays demonstrate a well-healed distal fibular fracture of the left ankle. He is cleared to resume all activity as desired without limitations or restrictions. he is considered clinically resolved in regards to left distal fibular fracture, and there is no need for scheduled follow-up appointments. Educated the patient that he should continue to follow-up with his neurologist in regards to procuring a home health aide or occupational therapy to assist with home limitations secondary to multiple sclerosis. Should any questions or concerns arise in the future, he is welcome to contact the office and arrange for follow-up as necessary. Patient expresses understanding and is in agreement with this treatment plan. The patient has nearly full range of motion along his left ankle as compared to the contralateral side. He does have a history of MS.  X-rays show the fracture be completely healed. Continue home exercise program.  Follow-up on an as-needed basis. If his condition changes, he would not hesitate to let us know    Subjective:   Patient ID: Fernando Farnaz  1965     HPI  Patient is a 59 y.o. male who presents for follow-up evaluation s/p left distal fibular fracture. He is now 4 months post injury. He states he has no pain at the fracture site. He continues to be limited weightbearing on the left lower extremity, as he suffers from multiple sclerosis, and has minimal ability to weight-bear on both lower extremities. He denies any recent exacerbation of bruising, swelling, or mechanical symptoms of the left ankle.  He does report that he is currently taking pain medication in regards to his lumbar pathology, but does not take pain medication specifically for pain in his ankle. The following portions of the patient's history were reviewed and updated as appropriate:  Past medical history, past surgical history, Family history, social history, current medications and allergies    Past Medical History:   Diagnosis Date   • CAD (coronary artery disease)     s/p ERVIN RCA x2 3/2014   • History of cardiovascular stress test     3/26/2015 [place] 38% prior inferiorwall MI. No ischemia. • History of EKG     6/13/2017 Normal sinus rhythm. Prior inferior wall MI. No change from prior. • Hx of echocardiogram     6/30/17 EF 40% Mild LVH.  Trace aortic and mitral regurgitation   • Hyperlipidemia    • Hypertension    • Ischemic cardiomyopathy    • MS (multiple sclerosis) ([facility])    • Myocardial infarction ([facility])    • PAF (paroxysmal atrial fibrillation) ([facility])    • Paroxysmal SVT (supraventricular tachycardia) ([facility])    • Pericardial effusion        Past Surgical History:   Procedure Laterality Date   • CARDIAC CATHETERIZATION      ERVIN RCA x2 3/2014       Family History   Problem Relation Age of Onset   • Heart disease Father    • Heart attack Father        Social History     Socioeconomic History   • Marital status: Unknown     Spouse name: Not on file   • Number of children: Not on file   • Years of education: Not on file   • Highest education level: Not on file   Occupational History   • Not on file   Tobacco Use   • Smoking status: Never   • Smokeless tobacco: Never   Vaping Use   • Vaping Use: Never used   Substance and Sexual Activity   • Alcohol use: Not Currently   • Drug use: Never   • Sexual activity: Not on file   Other Topics Concern   • Not on file   Social History Narrative   • Not on file     Social Determinants of Health     Financial Resource Strain: Not on file   Food Insecurity: Not on file   Transportation Needs: Not on file Physical Activity: Not on file   Stress: Not on file   Social Connections: Not on file   Intimate Partner Violence: Not on file   Housing Stability: Not on file         Current Outpatient Medications:   •  aspirin (ECOTRIN LOW STRENGTH) 81 mg EC tablet, Take 81 mg by mouth daily, Disp: , Rfl:   •  atorvastatin (LIPITOR) 40 mg tablet, take 1 tablet by mouth once daily, Disp: 90 tablet, Rfl: 3  •  cholecalciferol (VITAMIN D3) 1,000 units tablet, Take 1,000 Units by mouth daily, Disp: , Rfl:   •  metoprolol tartrate (LOPRESSOR) 50 mg tablet, Take 50 mg by mouth 2 (two) times a day, Disp: , Rfl:   •  oxyCODONE (ROXICODONE) 20 MG TABS, take 1 tablet by mouth every 8 hours if needed for MODERATE PAIN ( PAIN SCALE 4-6 ), Disp: , Rfl:   •  clonazePAM (KlonoPIN) 0.5 mg tablet, as needed (Patient not taking: Reported on 5/15/2023), Disp: , Rfl:   •  lisinopril (ZESTRIL) 20 mg tablet, Take 20 mg by mouth daily, Disp: , Rfl:   •  sertraline (ZOLOFT) 50 mg tablet, Take 50 mg by mouth as needed  (Patient not taking: Reported on 5/15/2023), Disp: , Rfl:     Allergies   Allergen Reactions   • Sulfa Antibiotics Rash       Review of Systems   Constitutional: Negative for chills, fatigue and fever. Gastrointestinal: Negative for nausea. Musculoskeletal: Positive for gait problem. As noted in HPI   Neurological: Positive for numbness. Negative for dizziness and headaches. All other systems reviewed and are negative.        Objective:  /90 (BP Location: Left arm, Patient Position: Sitting, Cuff Size: Large)   Pulse 64   Ht 5' 6" (1.676 m)   BMI 32.28 kg/m²     Ortho Exam  Left ankle -   Patient presents in wheelchair secondary to MS  No anatomical deformity  Skin is warm and dry to touch with no signs of erythema, ecchymosis, infection  No soft tissue swelling, No effusion noted  ROM PF 0-35, DF 0-20  No tenderness to palpation on exam  MMT 4/5 plantarflexion, dorsiflexion, inversion, and eversion  - anterior drawer  - medial talar tilt, - lateral talar tilt  - syndesmotic squeeze  Calf compartments are soft and supple  2+ TP and DP pulses with brisk capillary refill to the toes  Sural, saphenous, tibial, superficial and deep peroneal motor and sensory distributions intact  Sensation to light touch intact distally    Physical Exam  Vitals reviewed. Constitutional:       Appearance: He is well-developed. HENT:      Head: Normocephalic and atraumatic. Nose: Nose normal.   Eyes:      Conjunctiva/sclera: Conjunctivae normal.   Cardiovascular:      Rate and Rhythm: Normal rate. Pulmonary:      Effort: Pulmonary effort is normal.   Musculoskeletal:      Cervical back: Neck supple. Skin:     General: Skin is warm and dry. Capillary Refill: Capillary refill takes less than 2 seconds. Neurological:      Mental Status: He is alert and oriented to person, place, and time. Motor: Weakness present. Gait: Gait abnormal.   Psychiatric:         Mood and Affect: Mood normal.         Behavior: Behavior normal.          Diagnostic Test Review:  Attending Physician has personally reviewed pertinent imaging in PACS, impression is as follows:    Review of radiographic series taken 9/25/2023 of the left ankle shows well-healed left distal fibular fracture.     Procedures   No procedures performed this visit    Scribe Attestation    I,:  Cristina Forman am acting as a scribe while in the presence of the attending physician.:       I,:  Maurice Medina, DO personally performed the services described in this documentation    as scribed in my presence.:

## 2024-07-01 ENCOUNTER — OFFICE VISIT (OUTPATIENT)
Dept: CARDIOLOGY CLINIC | Facility: CLINIC | Age: 59
End: 2024-07-01
Payer: MEDICARE

## 2024-07-01 VITALS
DIASTOLIC BLOOD PRESSURE: 85 MMHG | SYSTOLIC BLOOD PRESSURE: 135 MMHG | BODY MASS INDEX: 32.28 KG/M2 | HEIGHT: 66 IN | HEART RATE: 74 BPM

## 2024-07-01 DIAGNOSIS — I25.10 CORONARY ARTERY DISEASE INVOLVING NATIVE CORONARY ARTERY OF NATIVE HEART WITHOUT ANGINA PECTORIS: ICD-10-CM

## 2024-07-01 DIAGNOSIS — I10 HTN (HYPERTENSION), BENIGN: Primary | ICD-10-CM

## 2024-07-01 DIAGNOSIS — I25.5 ISCHEMIC CARDIOMYOPATHY: ICD-10-CM

## 2024-07-01 DIAGNOSIS — E78.2 MIXED HYPERLIPIDEMIA: ICD-10-CM

## 2024-07-01 PROCEDURE — 93000 ELECTROCARDIOGRAM COMPLETE: CPT | Performed by: INTERNAL MEDICINE

## 2024-07-01 PROCEDURE — 99214 OFFICE O/P EST MOD 30 MIN: CPT | Performed by: INTERNAL MEDICINE

## 2024-07-01 RX ORDER — AMOXICILLIN 250 MG
1 CAPSULE ORAL DAILY
COMMUNITY

## 2024-07-01 RX ORDER — METOPROLOL SUCCINATE 50 MG/1
50 TABLET, EXTENDED RELEASE ORAL 2 TIMES DAILY
COMMUNITY
Start: 2024-03-27

## 2024-07-01 RX ORDER — ACETAMINOPHEN 325 MG/1
325 TABLET ORAL EVERY 6 HOURS PRN
COMMUNITY

## 2024-07-01 RX ORDER — METHOCARBAMOL 500 MG/1
500 TABLET, FILM COATED ORAL 4 TIMES DAILY PRN
COMMUNITY

## 2024-07-01 NOTE — PROGRESS NOTES
" Patient ID: Tigre Reina is a 58 y.o. male.        Plan:      Coronary artery disease involving native coronary artery of native heart without angina pectoris  Inferior wall MI 2014 with stenting but residual dysfunction.  No recent CP.       Ischemic cardiomyopathy  Mild but stable.    Mixed hyperlipidemia  Tolerating high-intensity statin therapy and will continue current regimen.       Follow up Plan/Other summary comments:  Return in about 1 year (around 7/1/2025).    HPI: Patient seen in f/u regarding the above issues.  Recent hospital stay reviewed.  [facility].  No CP. No change in dyspnea.  Biggest problem remains his MS.    In 2014 he had an acute inferior wall MI.  He received a stent of the right coronary artery but certainly there was residual inferior wall dysfunction.  His main functional issue in the world is multiple sclerosis.  He sees Neurology at [facility] in this regard.    ECG today: NSR. Prior inferior wall MI. Possible prior anterolateral MI.    Most recent or relevant cardiac/vascular testing:     Echocardiogram 6/30/2017:  Ejection fraction 40%.  Prior inferior wall MI. Mild LVH.  TTE4/24/2024: LVEF 40-45%.  Myoview 10/29/2020: EF 59%. Prior inferior wall MI. No ischemia.  LDL 4/8/2021: 71      Past Surgical History:   Procedure Laterality Date    CARDIAC CATHETERIZATION      ERVIN RCA x2 3/2014       Lipid Profile: Reviewed      Review of Systems   10  point ROS  was otherwise non pertinent or negative except as per HPI or as below.   Gait: Using a wheelchair.        Objective:     /85   Pulse 74   Ht 5' 6\" (1.676 m)   BMI 32.28 kg/m²     PHYSICAL EXAM:    General:  Normal appearance in no distress.  Eyes:  Anicteric.  Oral mucosa:  Moist.  Neck:  No JVD. Carotid upstrokes are brisk without bruits.  No masses.  Chest:  Clear to auscultation and percussion.  Cardiac:  Normal PMI.  Normal S1 and S2.  No murmur gallop or rub.  Abdomen:  Soft and nontender. No palpable " organomegaly or aortic enlargement.  Extremities:  No peripheral edema.  Musculoskeletal:  Symmetric.   Vascular:  Femoral pulses are brisk without bruits.  Popliteal pulses are intact bilaterally.   Pedal pulses are intact.  Neuro:  Weakness of the right arm and leg.   Psych:  Alert and oriented x3.      Meds reviewed.    Past Medical History:   Diagnosis Date    CAD (coronary artery disease)     s/p ERVIN RCA x2 3/2014    History of cardiovascular stress test     3/26/2015 [place] 38% prior inferiorwall MI. No ischemia.    History of EKG     6/13/2017 Normal sinus rhythm. Prior inferior wall MI. No change from prior.    Hx of echocardiogram     6/30/17 EF 40% Mild LVH. Trace aortic and mitral regurgitation    Hyperlipidemia     Hypertension     Ischemic cardiomyopathy     MS (multiple sclerosis) ([facility])     Myocardial infarction ([facility])     PAF (paroxysmal atrial fibrillation) ([facility])     Paroxysmal SVT (supraventricular tachycardia)     Pericardial effusion            Social History     Tobacco Use   Smoking Status Never   Smokeless Tobacco Never

## 2025-05-09 DIAGNOSIS — I10 HTN (HYPERTENSION), BENIGN: Primary | ICD-10-CM

## 2025-05-09 NOTE — TELEPHONE ENCOUNTER
Medication: Metoprolol     Dose/Frequency: 50 mg 2 times daily     Quantity: 60    Pharmacy: [facility]/pharmacy #[id number] - [place] - [id number]     Office:   [x] PCP/Provider - Dr. Cano   [] Speciality/Provider -     Does the patient have enough for 3 days?   [x] Yes   [] No - Send as HP to POD       Comment: Patient called because refill was denied due to not being seen since 2021, but patient was seen 7/1/2024 and has an appointment scheduled for 7/24/2025.

## 2025-05-11 RX ORDER — METOPROLOL SUCCINATE 50 MG/1
50 TABLET, EXTENDED RELEASE ORAL 2 TIMES DAILY
Qty: 180 TABLET | Refills: 5 | Status: SHIPPED | OUTPATIENT
Start: 2025-05-11

## 2025-07-24 ENCOUNTER — OFFICE VISIT (OUTPATIENT)
Dept: CARDIOLOGY CLINIC | Facility: CLINIC | Age: 60
End: 2025-07-24
Payer: COMMERCIAL

## 2025-07-24 VITALS
HEART RATE: 62 BPM | HEIGHT: 66 IN | SYSTOLIC BLOOD PRESSURE: 116 MMHG | DIASTOLIC BLOOD PRESSURE: 80 MMHG | BODY MASS INDEX: 32.28 KG/M2

## 2025-07-24 DIAGNOSIS — I10 HTN (HYPERTENSION), BENIGN: ICD-10-CM

## 2025-07-24 DIAGNOSIS — E78.2 MIXED HYPERLIPIDEMIA: ICD-10-CM

## 2025-07-24 DIAGNOSIS — I25.5 ISCHEMIC CARDIOMYOPATHY: Primary | ICD-10-CM

## 2025-07-24 DIAGNOSIS — I25.10 CORONARY ARTERY DISEASE INVOLVING NATIVE CORONARY ARTERY OF NATIVE HEART WITHOUT ANGINA PECTORIS: ICD-10-CM

## 2025-07-24 PROCEDURE — 93000 ELECTROCARDIOGRAM COMPLETE: CPT | Performed by: INTERNAL MEDICINE

## 2025-07-24 PROCEDURE — 99214 OFFICE O/P EST MOD 30 MIN: CPT | Performed by: INTERNAL MEDICINE

## 2025-07-24 RX ORDER — CLOBETASOL PROPIONATE 0.5 MG/G
CREAM TOPICAL 2 TIMES DAILY
COMMUNITY
Start: 2025-05-20

## 2025-07-24 RX ORDER — MICONAZOLE NITRATE 2 G/100G
1 CREAM TOPICAL 2 TIMES DAILY
COMMUNITY
Start: 2025-03-19

## 2025-07-24 RX ORDER — LISINOPRIL 40 MG/1
1 TABLET ORAL DAILY
COMMUNITY
Start: 2025-06-01

## 2025-07-24 NOTE — PROGRESS NOTES
" Patient ID: Tigre Reina is a 59 y.o. male.        Plan:      Assessment & Plan  Ischemic cardiomyopathy  Mild but stable.  Coronary artery disease involving native coronary artery of native heart without angina pectoris  Inferior wall MI 2014 with stenting but residual dysfunction.  No recent CP.     Mixed hyperlipidemia  Tolerating high-intensity statin therapy and will continue current regimen.    HTN (hypertension), benign  Well controlled and would continue current regimen.        Follow up Plan/Other summary comments:  Return in about 1 year (around 7/24/2026).    HPI: Patient seen in f/u regarding the above issues.  Mainly using a wheelchair with his MS.  He has a car with hand controls but it needs new brakes.  No CP.  No syncope.    In 2014 he had an acute inferior wall MI.  He received a stent of the right coronary artery but certainly there was residual inferior wall dysfunction.  His main functional issue in the world is multiple sclerosis.  He sees Neurology at [facility] in this regard.    Results for orders placed or performed in visit on 07/24/25   POCT ECG    Impression    NSR. Possible prior inferior wall MI.NSSTs.         Most recent or relevant cardiac/vascular testing:     Echocardiogram 6/30/2017:  Ejection fraction 40%.  Prior inferior wall MI. Mild LVH.  TTE4/24/2024: LVEF 40-45%.  Myoview 10/29/2020: EF 59%. Prior inferior wall MI. No ischemia.  LDL 4/8/2021: 71      Past Surgical History[1]    Lipid Profile: Reviewed      Review of Systems   10  point ROS  was otherwise non pertinent or negative except as per HPI or as below.   Gait: Wheelchair.        Objective:     /80   Pulse 62   Ht 5' 6\" (1.676 m)   BMI 32.28 kg/m²     PHYSICAL EXAM:    General:  Normal appearance in no distress.  Eyes:  Anicteric.  Oral mucosa:  Moist.  Neck:  No JVD. Carotid upstrokes are brisk without bruits.  No masses.  Chest:  Clear to auscultation.  Cardiac:  No palpable PMI.  Normal S1 and S2.  No murmur " gallop or rub.  Abdomen:  Soft and nontender. No palpable organomegaly or aortic enlargement.  Extremities:  Trace peripheral edema.  Musculoskeletal:  Symmetric.   Vascular:  Femoral pulses are brisk without bruits.  Popliteal pulses are intact bilaterally.   Pedal pulses are intact.  Neuro:  Grossly symmetric.  Psych:  Alert and oriented x3.      Meds reviewed.    Past Medical History[2]        Tobacco Use History[3]               [1]   Past Surgical History:  Procedure Laterality Date    CARDIAC CATHETERIZATION      ERVIN RCA x2 3/2014    FL LUMBAR PUNCTURE DIAGNOSTIC  5/11/2016   [2]   Past Medical History:  Diagnosis Date    CAD (coronary artery disease)     s/p ERVIN RCA x2 3/2014    History of cardiovascular stress test     3/26/2015 [place] 38% prior inferiorwall MI. No ischemia.    History of EKG     6/13/2017 Normal sinus rhythm. Prior inferior wall MI. No change from prior.    Hx of echocardiogram     6/30/17 EF 40% Mild LVH. Trace aortic and mitral regurgitation    Hyperlipidemia     Hypertension     Ischemic cardiomyopathy     MS (multiple sclerosis) ([facility])     Myocardial infarction ([facility])     PAF (paroxysmal atrial fibrillation) ([facility])     Paroxysmal SVT (supraventricular tachycardia) ([facility])     Pericardial effusion    [3]   Social History  Tobacco Use   Smoking Status Never   Smokeless Tobacco Never